# Patient Record
Sex: FEMALE | URBAN - METROPOLITAN AREA
[De-identification: names, ages, dates, MRNs, and addresses within clinical notes are randomized per-mention and may not be internally consistent; named-entity substitution may affect disease eponyms.]

---

## 2024-07-03 ENCOUNTER — APPOINTMENT (OUTPATIENT)
Dept: INTEGRATIVE MEDICINE | Facility: CLINIC | Age: 59
End: 2024-07-03
Payer: COMMERCIAL

## 2024-07-03 DIAGNOSIS — M99.05 SOMATIC DYSFUNCTION OF PELVIS REGION: ICD-10-CM

## 2024-07-03 DIAGNOSIS — M54.2 NECK PAIN: ICD-10-CM

## 2024-07-03 DIAGNOSIS — M79.10 MYALGIA: ICD-10-CM

## 2024-07-03 DIAGNOSIS — M99.02 SOMATIC DYSFUNCTION OF THORACIC REGION: Primary | ICD-10-CM

## 2024-07-03 DIAGNOSIS — M99.03 SOMATIC DYSFUNCTION OF LUMBAR REGION: ICD-10-CM

## 2024-07-03 PROCEDURE — 98941 CHIROPRACT MANJ 3-4 REGIONS: CPT | Performed by: CHIROPRACTOR

## 2024-07-03 PROCEDURE — 99203 OFFICE O/P NEW LOW 30 MIN: CPT | Performed by: CHIROPRACTOR

## 2024-07-03 ASSESSMENT — ENCOUNTER SYMPTOMS
NAUSEA: 0
COLOR CHANGE: 0
SHORTNESS OF BREATH: 0
DYSURIA: 0
DIZZINESS: 0
FEVER: 0
AGITATION: 0
DIARRHEA: 0
VOMITING: 0
DIFFICULTY URINATING: 0

## 2024-07-03 NOTE — PROGRESS NOTES
Assessment/Plan   The patient's neck pain is most consistent with myofascial pain and segmental dysfunction.  There are no red flags or signs of radiculopathy or neurologic deficits.  Can hold off of imaging pending a trial of care including soft tissue and spinal manipulation.  May avoid HVLA of cervical spine due to patient preference.    Visits this year: 1    Subjective     HPI - Neck pain  7/3/24 -patient reports gradual onset over the past few months of mild frequent neck pain and tightness.  Feels like she is hunching forwards.  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  Denies any headache or radiating pain numbness or tingling in the upper extremities.  Symptoms are exacerbated when sleeping.  Has not had any moderate to severe episodes of pain.  Denies any recent injury to the neck and has not seen a chiropractor for neck pain.  In the past benefited from chiropractic spinal manipulation for low back pain however this was several years prior.  Is not very active at the moment    Review of Systems   Constitutional:  Negative for fever.   Eyes:  Negative for visual disturbance.   Respiratory:  Negative for shortness of breath.    Cardiovascular:  Negative for chest pain.   Gastrointestinal:  Negative for diarrhea, nausea and vomiting.   Genitourinary:  Negative for difficulty urinating and dysuria.   Skin:  Negative for color change.   Neurological:  Negative for dizziness.   Psychiatric/Behavioral:  Negative for agitation.    All other systems reviewed and are negative.      Objective   Examination findings (e.g., palpation & ROM): Dec CS ROM with pain, HTN/tender upper trapezius/levator scapulae BL, CS erectors     Segmental joint dysfunction was identified in the following areas using motion palpation and/or pain provocation assessment:  Cervical: 7  Thoracic: 2, 4-8  Lumbopelvic:  1    Physical Exam  Neurological:      General: No focal deficit present.       Mental Status: She is alert.      Sensory: Sensation is intact.      Motor: Motor function is intact.      Coordination: Coordination is intact.      Gait: Gait is intact.      Deep Tendon Reflexes: Reflexes are normal and symmetric.      Reflex Scores:       Tricep reflexes are 2+ on the right side and 2+ on the left side.       Bicep reflexes are 2+ on the right side and 2+ on the left side.       Brachioradialis reflexes are 2+ on the right side and 2+ on the left side.       Patellar reflexes are 2+ on the right side and 2+ on the left side.       Achilles reflexes are 2+ on the right side and 2+ on the left side.     Comments: UE str wnl 7/3/24         Plan   Today's treatment:  SMT to regions of segmental dysfunction identified on exam, using age-appropriate force, and manual diversified technique. Gentle mobilization on CS  STM to patient tolerance to hypertonic paraspinal muscles  Manual dynamic stretching of the upper trapezius BL 5m  Patient noted improved mobility and reduced pain post-treatment    Treatment Plan:   The patient and I discussed the risks and benefits of chiropractic care. Based on the patient's subjective complaints along with the examination findings, it is advised that a course of chiropractic treatment be initiated. The patient provided consent for care. The patient tolerated today's treatment with little or no additional discomfort and was instructed to contact the office for questions or concerns. Will see patient once per week then every 2 weeks when symptoms become mild/manageable, further spaced apart contingent upon improvement.     This chart note was generated using dictation software, and as such, there may be typographical errors present. Abbreviations: Cervical spine (CS), cervical-thoracic (CT), Dry needling (DN), Flexion adduction internal rotation (FAIR), high velocity, low amplitude (HVLA), Lumbar spine (LS), Soft tissue manipulation (STM), spinal manipulative  therapy (SMT), Straight leg raise (SLR), Thoracic spine (TS).